# Patient Record
Sex: FEMALE | Race: WHITE | NOT HISPANIC OR LATINO
[De-identification: names, ages, dates, MRNs, and addresses within clinical notes are randomized per-mention and may not be internally consistent; named-entity substitution may affect disease eponyms.]

---

## 2018-09-15 ENCOUNTER — TRANSCRIPTION ENCOUNTER (OUTPATIENT)
Age: 33
End: 2018-09-15

## 2019-12-08 ENCOUNTER — TRANSCRIPTION ENCOUNTER (OUTPATIENT)
Age: 34
End: 2019-12-08

## 2021-01-09 ENCOUNTER — NON-APPOINTMENT (OUTPATIENT)
Age: 36
End: 2021-01-09

## 2021-07-22 ENCOUNTER — TRANSCRIPTION ENCOUNTER (OUTPATIENT)
Age: 36
End: 2021-07-22

## 2022-01-02 ENCOUNTER — TRANSCRIPTION ENCOUNTER (OUTPATIENT)
Age: 37
End: 2022-01-02

## 2022-03-16 ENCOUNTER — NON-APPOINTMENT (OUTPATIENT)
Age: 37
End: 2022-03-16

## 2022-03-17 ENCOUNTER — APPOINTMENT (OUTPATIENT)
Dept: BREAST CENTER | Facility: CLINIC | Age: 37
End: 2022-03-17
Payer: COMMERCIAL

## 2022-03-17 VITALS
SYSTOLIC BLOOD PRESSURE: 116 MMHG | BODY MASS INDEX: 25.49 KG/M2 | DIASTOLIC BLOOD PRESSURE: 85 MMHG | WEIGHT: 135 LBS | HEIGHT: 61 IN | HEART RATE: 91 BPM

## 2022-03-17 DIAGNOSIS — Z80.0 FAMILY HISTORY OF MALIGNANT NEOPLASM OF DIGESTIVE ORGANS: ICD-10-CM

## 2022-03-17 DIAGNOSIS — Z78.9 OTHER SPECIFIED HEALTH STATUS: ICD-10-CM

## 2022-03-17 DIAGNOSIS — N64.4 MASTODYNIA: ICD-10-CM

## 2022-03-17 DIAGNOSIS — Z80.3 FAMILY HISTORY OF MALIGNANT NEOPLASM OF BREAST: ICD-10-CM

## 2022-03-17 DIAGNOSIS — Z80.7 FAMILY HISTORY OF OTHER MALIGNANT NEOPLASMS OF LYMPHOID, HEMATOPOIETIC AND RELATED TISSUES: ICD-10-CM

## 2022-03-17 DIAGNOSIS — N64.59 OTHER SIGNS AND SYMPTOMS IN BREAST: ICD-10-CM

## 2022-03-17 PROCEDURE — 99204 OFFICE O/P NEW MOD 45 MIN: CPT

## 2022-03-17 RX ORDER — BACILLUS COAGULANS/INULIN 1B-250 MG
CAPSULE ORAL
Refills: 0 | Status: ACTIVE | COMMUNITY

## 2022-03-17 NOTE — PAST MEDICAL HISTORY
[Menarche Age ____] : age at menarche was [unfilled] [unknown] : the patient is unsure of the date of her LMP [Total Preg ___] : G[unfilled] [Live Births ___] : P[unfilled]  [Age At Live Birth ___] : Age at live birth: [unfilled] [History of Hormone Replacement Treatment] : has no history of hormone replacement treatment [de-identified] : not currently pregnant [FreeTextEntry3] : currently 4 weeks postpartum [FreeTextEntry7] : pill use for 10 years [FreeTextEntry8] : 13 mo + 9 mo + currently breastfeeding

## 2022-03-17 NOTE — HISTORY OF PRESENT ILLNESS
[FreeTextEntry1] : Ms. Marquez is a 36 year old woman who presents for a consultation for right nipple problems. She had a baby a month ago. Her baby had a tongue tie at birth which led to a raw right nipple after breastfeeding until it was corrected with surgery. She also had right mastitis 4 days after delivery that was treated with antibiotics. In addition, she has had two right nipple fissures. She has nipple pain that radiates to the back of the breast. There is a "bleb" in the nipple that has persisted. She has a tendency towards milk oversupply and tends to breastfeed the baby on one side and pump the other at each feeding. No lumps.\par \par Her family history is significant for breast cancer in a maternal great grandmother (MGF's mother).

## 2022-03-17 NOTE — PHYSICAL EXAM
[Normocephalic] : normocephalic [Atraumatic] : atraumatic [Sclera nonicteric] : sclera nonicteric [Supple] : supple [No Supraclavicular Adenopathy] : no supraclavicular adenopathy [No Cervical Adenopathy] : no cervical adenopathy [Clear to Auscultation Bilat] : clear to auscultation bilaterally [Normal Sinus Rhythm] : normal sinus rhythm [Examined in the supine and seated position] : examined in the supine and seated position [No dominant masses] : no dominant masses in right breast  [No dominant masses] : no dominant masses left breast [No Nipple Retraction] : no left nipple retraction [No Axillary Lymphadenopathy] : no left axillary lymphadenopathy [Soft] : abdomen soft [No Edema] : no edema [No Rashes] : no rashes [No Ulceration] : no ulceration [de-identified] : 0.1 cm superficial skin-colored bump on nipple. No erythema or fluctuance.

## 2022-03-21 ENCOUNTER — APPOINTMENT (OUTPATIENT)
Dept: BREAST CENTER | Facility: CLINIC | Age: 37
End: 2022-03-21
Payer: COMMERCIAL

## 2022-03-21 VITALS
HEIGHT: 61 IN | DIASTOLIC BLOOD PRESSURE: 83 MMHG | SYSTOLIC BLOOD PRESSURE: 134 MMHG | BODY MASS INDEX: 25.49 KG/M2 | WEIGHT: 135 LBS | HEART RATE: 74 BPM

## 2022-03-21 PROCEDURE — 99213 OFFICE O/P EST LOW 20 MIN: CPT

## 2022-03-21 NOTE — PHYSICAL EXAM
[Normocephalic] : normocephalic [Atraumatic] : atraumatic [Sclera nonicteric] : sclera nonicteric [Examined in the supine and seated position] : examined in the supine and seated position [No dominant masses] : no dominant masses in right breast  [No dominant masses] : no dominant masses left breast [No Nipple Retraction] : no right nipple retraction [No Axillary Lymphadenopathy] : no right axillary lymphadenopathy [No Edema] : no edema [No Rashes] : no rashes [No Ulceration] : no ulceration [de-identified] : Tiny dermal nodule in nipple. Trace erythema in UIQ. No induration or fluctuance

## 2022-03-21 NOTE — HISTORY OF PRESENT ILLNESS
[FreeTextEntry1] : Ms. Marquez is a 36 year old woman here for a follow-up for right mastitis. She is one month postpartum. Her baby had a tongue tie at birth which led to a raw right nipple. She has also had right mastitis 4 days after delivery, and two right nipple fissures. She has a tiny dermal bump in the right nipple, and right nipple pain that radiates to the back of the breast. \par \par Two days ago, she noticed pain and warmth in the right upper and medial breast. She also had chills, and a day ago noticed a red streak going from the nipple up. The red streak has subsided. She will be picking up antibiotics after this office visit. \par \par Her family history is significant for breast cancer in a maternal great grandmother (MGF's mother).

## 2022-03-21 NOTE — PAST MEDICAL HISTORY
[Menarche Age ____] : age at menarche was [unfilled] [History of Hormone Replacement Treatment] : has no history of hormone replacement treatment [unknown] : the patient is unsure of the date of her LMP [Total Preg ___] : G[unfilled] [Live Births ___] : P[unfilled]  [Age At Live Birth ___] : Age at live birth: [unfilled] [de-identified] : not currently pregnant [FreeTextEntry3] : currently 4 weeks postpartum [FreeTextEntry7] : pill use for 10 years [FreeTextEntry8] : 13 mo + 9 mo + currently breastfeeding

## 2022-03-28 ENCOUNTER — EMERGENCY (EMERGENCY)
Facility: HOSPITAL | Age: 37
LOS: 0 days | Discharge: HOME | End: 2022-03-29
Attending: EMERGENCY MEDICINE
Payer: COMMERCIAL

## 2022-03-28 VITALS — WEIGHT: 134.92 LBS | HEIGHT: 61 IN

## 2022-03-28 DIAGNOSIS — R06.02 SHORTNESS OF BREATH: ICD-10-CM

## 2022-03-28 DIAGNOSIS — R07.89 OTHER CHEST PAIN: ICD-10-CM

## 2022-03-28 DIAGNOSIS — Z20.822 CONTACT WITH AND (SUSPECTED) EXPOSURE TO COVID-19: ICD-10-CM

## 2022-03-28 LAB
ALBUMIN SERPL ELPH-MCNC: 3.6 G/DL — SIGNIFICANT CHANGE UP (ref 3.3–5)
ALP SERPL-CCNC: 73 U/L — SIGNIFICANT CHANGE UP (ref 40–120)
ALT FLD-CCNC: 28 U/L — SIGNIFICANT CHANGE UP (ref 12–78)
ANION GAP SERPL CALC-SCNC: 4 MMOL/L — LOW (ref 5–17)
APTT BLD: 33.6 SEC — SIGNIFICANT CHANGE UP (ref 27.5–35.5)
AST SERPL-CCNC: 16 U/L — SIGNIFICANT CHANGE UP (ref 15–37)
BASOPHILS # BLD AUTO: 0.02 K/UL — SIGNIFICANT CHANGE UP (ref 0–0.2)
BASOPHILS NFR BLD AUTO: 0.3 % — SIGNIFICANT CHANGE UP (ref 0–2)
BILIRUB SERPL-MCNC: 0.3 MG/DL — SIGNIFICANT CHANGE UP (ref 0.2–1.2)
BUN SERPL-MCNC: 12 MG/DL — SIGNIFICANT CHANGE UP (ref 7–23)
CALCIUM SERPL-MCNC: 9.1 MG/DL — SIGNIFICANT CHANGE UP (ref 8.5–10.1)
CHLORIDE SERPL-SCNC: 109 MMOL/L — HIGH (ref 96–108)
CO2 SERPL-SCNC: 28 MMOL/L — SIGNIFICANT CHANGE UP (ref 22–31)
CREAT SERPL-MCNC: 0.84 MG/DL — SIGNIFICANT CHANGE UP (ref 0.5–1.3)
D DIMER BLD IA.RAPID-MCNC: <150 NG/ML DDU — SIGNIFICANT CHANGE UP
EGFR: 92 ML/MIN/1.73M2 — SIGNIFICANT CHANGE UP
EOSINOPHIL # BLD AUTO: 0 K/UL — SIGNIFICANT CHANGE UP (ref 0–0.5)
EOSINOPHIL NFR BLD AUTO: 0 % — SIGNIFICANT CHANGE UP (ref 0–6)
FLUAV AG NPH QL: SIGNIFICANT CHANGE UP
FLUBV AG NPH QL: SIGNIFICANT CHANGE UP
GLUCOSE SERPL-MCNC: 98 MG/DL — SIGNIFICANT CHANGE UP (ref 70–99)
HCG SERPL-ACNC: <1 MIU/ML — SIGNIFICANT CHANGE UP
HCT VFR BLD CALC: 41.2 % — SIGNIFICANT CHANGE UP (ref 34.5–45)
HGB BLD-MCNC: 14 G/DL — SIGNIFICANT CHANGE UP (ref 11.5–15.5)
IMM GRANULOCYTES NFR BLD AUTO: 0.1 % — SIGNIFICANT CHANGE UP (ref 0–1.5)
INR BLD: 1.03 RATIO — SIGNIFICANT CHANGE UP (ref 0.88–1.16)
LYMPHOCYTES # BLD AUTO: 1.79 K/UL — SIGNIFICANT CHANGE UP (ref 1–3.3)
LYMPHOCYTES # BLD AUTO: 25.7 % — SIGNIFICANT CHANGE UP (ref 13–44)
MAGNESIUM SERPL-MCNC: 2.1 MG/DL — SIGNIFICANT CHANGE UP (ref 1.6–2.6)
MCHC RBC-ENTMCNC: 31.5 PG — SIGNIFICANT CHANGE UP (ref 27–34)
MCHC RBC-ENTMCNC: 34 GM/DL — SIGNIFICANT CHANGE UP (ref 32–36)
MCV RBC AUTO: 92.6 FL — SIGNIFICANT CHANGE UP (ref 80–100)
MONOCYTES # BLD AUTO: 0.71 K/UL — SIGNIFICANT CHANGE UP (ref 0–0.9)
MONOCYTES NFR BLD AUTO: 10.2 % — SIGNIFICANT CHANGE UP (ref 2–14)
NEUTROPHILS # BLD AUTO: 4.44 K/UL — SIGNIFICANT CHANGE UP (ref 1.8–7.4)
NEUTROPHILS NFR BLD AUTO: 63.7 % — SIGNIFICANT CHANGE UP (ref 43–77)
NT-PROBNP SERPL-SCNC: 36 PG/ML — SIGNIFICANT CHANGE UP (ref 0–125)
PLATELET # BLD AUTO: 191 K/UL — SIGNIFICANT CHANGE UP (ref 150–400)
POTASSIUM SERPL-MCNC: 3.9 MMOL/L — SIGNIFICANT CHANGE UP (ref 3.5–5.3)
POTASSIUM SERPL-SCNC: 3.9 MMOL/L — SIGNIFICANT CHANGE UP (ref 3.5–5.3)
PROT SERPL-MCNC: 6.9 GM/DL — SIGNIFICANT CHANGE UP (ref 6–8.3)
PROTHROM AB SERPL-ACNC: 12 SEC — SIGNIFICANT CHANGE UP (ref 10.5–13.4)
RBC # BLD: 4.45 M/UL — SIGNIFICANT CHANGE UP (ref 3.8–5.2)
RBC # FLD: 11.1 % — SIGNIFICANT CHANGE UP (ref 10.3–14.5)
RSV RNA NPH QL NAA+NON-PROBE: SIGNIFICANT CHANGE UP
SARS-COV-2 RNA SPEC QL NAA+PROBE: SIGNIFICANT CHANGE UP
SODIUM SERPL-SCNC: 141 MMOL/L — SIGNIFICANT CHANGE UP (ref 135–145)
TROPONIN I, HIGH SENSITIVITY RESULT: 9.11 NG/L — SIGNIFICANT CHANGE UP
WBC # BLD: 6.97 K/UL — SIGNIFICANT CHANGE UP (ref 3.8–10.5)
WBC # FLD AUTO: 6.97 K/UL — SIGNIFICANT CHANGE UP (ref 3.8–10.5)

## 2022-03-28 PROCEDURE — 84702 CHORIONIC GONADOTROPIN TEST: CPT

## 2022-03-28 PROCEDURE — 93005 ELECTROCARDIOGRAM TRACING: CPT

## 2022-03-28 PROCEDURE — 83735 ASSAY OF MAGNESIUM: CPT

## 2022-03-28 PROCEDURE — 80053 COMPREHEN METABOLIC PANEL: CPT

## 2022-03-28 PROCEDURE — 71275 CT ANGIOGRAPHY CHEST: CPT | Mod: MA

## 2022-03-28 PROCEDURE — 85730 THROMBOPLASTIN TIME PARTIAL: CPT

## 2022-03-28 PROCEDURE — 99285 EMERGENCY DEPT VISIT HI MDM: CPT

## 2022-03-28 PROCEDURE — 85025 COMPLETE CBC W/AUTO DIFF WBC: CPT

## 2022-03-28 PROCEDURE — 85379 FIBRIN DEGRADATION QUANT: CPT

## 2022-03-28 PROCEDURE — 99285 EMERGENCY DEPT VISIT HI MDM: CPT | Mod: 25

## 2022-03-28 PROCEDURE — 85610 PROTHROMBIN TIME: CPT

## 2022-03-28 PROCEDURE — 93010 ELECTROCARDIOGRAM REPORT: CPT

## 2022-03-28 PROCEDURE — 0241U: CPT

## 2022-03-28 PROCEDURE — 71275 CT ANGIOGRAPHY CHEST: CPT | Mod: 26,MA

## 2022-03-28 PROCEDURE — 84484 ASSAY OF TROPONIN QUANT: CPT

## 2022-03-28 PROCEDURE — 36415 COLL VENOUS BLD VENIPUNCTURE: CPT

## 2022-03-28 PROCEDURE — 83880 ASSAY OF NATRIURETIC PEPTIDE: CPT

## 2022-03-28 NOTE — ED PROVIDER NOTE - NSFOLLOWUPINSTRUCTIONS_ED_ALL_ED_FT
please follow up with your doctor in 2-3 days.   take motrin and tylenol for pain.   may use ice or heating pads for comfort.   drink plenty of fluids.   return to ED for any concerns

## 2022-03-28 NOTE — ED PROVIDER NOTE - PATIENT PORTAL LINK FT
You can access the FollowMyHealth Patient Portal offered by [facility] by registering at the following website: [email or web address]. By joining [employer]’s FollowMyHealth portal, you will also be able to view your health information using other applications (apps) compatible with our system.

## 2022-03-28 NOTE — ED PROVIDER NOTE - OBJECTIVE STATEMENT
37 y/o female approx 5 weeks post partum in ED c/o intermittent worsening cp and sob x 3 days.   pt states worse with movement and deep breathing.    states took motrin with some relief.   pt denies any fever, HA, n/v/d/abd pain.   tolerating PO.   states currently on abx for mastitis.

## 2022-03-28 NOTE — ED STATDOCS - PROGRESS NOTE DETAILS
Martha Silver, DO:  Pt. is a 37 yo F  approx 5 weeks post partum s/p  currently on 6 days of doxycycline for mastitis presents with chest pain since last night.  Patients pain is in left side of sternum and left chest and in left lateral ribs and left axilla and arm.  She states since she has mastitis she rotates her body to feed so it is not as painful.  She took advil today with some relief.  She states pain feels like tightness and it was hard to breathe while in the car on the way home from the plastic surgeon she saw today for breast evaluation.  She was told her breast exam was normal and there was nothing to drain or treat.  Patient is nursing and has nursed with all pregnancies.  She states her lactation nurse told her she has a bleb on her right nipple which is causing the pain.  Patient had EKG in intake showing flipped T waves in III, AvF and v3.  She was sent to the main ED for evaluation.  Discussed possible diagnoses with patient including costochondritis. pleurisy, and PE due to post partum status.

## 2022-03-28 NOTE — ED ADULT NURSE NOTE - CHIEF COMPLAINT QUOTE
Pt sent in from [facility] c/o L sided intermittent chest pain radiating to the L shoulder since last night. Pain is worse on inspiration. Currently 5 weeks postpartum, has R breast mastitis-on her 6th day of doxycyline. Denies SOB and fevers at this time. EKG in progress.

## 2022-03-28 NOTE — ED ADULT NURSE NOTE - NSHOSCREENINGQ1_ED_ALL_ED
Was the patient seen in the last year in this department? {Yes/No:20266}    Does patient have an active prescription for medications requested? {Yes/No:20266}    Received Request Via: {REFILL PATIENT/PHARMACY:[id number]}      
No

## 2022-03-28 NOTE — ED ADULT TRIAGE NOTE - CHIEF COMPLAINT QUOTE
Pt sent in from [facility] c/o L sided intermittent chest pain radiating to the L shoulder since last night. Pain is worse on inspiration. Currently 5 months postpartum, has R breast mastitis-on her 6th day of doxycyline. Denies SOB and fevers at this time. EKG in progress. Pt sent in from [facility] c/o L sided intermittent chest pain radiating to the L shoulder since last night. Pain is worse on inspiration. Currently 5 weeks postpartum, has R breast mastitis-on her 6th day of doxycyline. Denies SOB and fevers at this time. EKG in progress.

## 2022-03-28 NOTE — ED ADULT NURSE NOTE - OBJECTIVE STATEMENT
Pt presents to the ED c/o right nipple pain and left chest pain and SOB since Saturday. Pt had a vaginal birth 5 weeks ago and has had mastitis twice in the left breast. Chest pain on the left side gets worse upon inspiration and radiates to the left back. pt endorses SOB but is able to speak in full sentences. Respirations even and unlabored. Denies fevers, chills, N/V/D. pt is normal sinus on the cardiac monitor.

## 2022-03-29 VITALS
DIASTOLIC BLOOD PRESSURE: 82 MMHG | TEMPERATURE: 98 F | RESPIRATION RATE: 17 BRPM | SYSTOLIC BLOOD PRESSURE: 115 MMHG | HEART RATE: 80 BPM | OXYGEN SATURATION: 99 %

## 2022-03-31 RX ORDER — DICLOXACILLIN SODIUM 500 MG/1
500 CAPSULE ORAL 4 TIMES DAILY
Qty: 4 | Refills: 0 | Status: ACTIVE | COMMUNITY
Start: 2022-03-31 | End: 1900-01-01

## 2022-04-01 ENCOUNTER — APPOINTMENT (OUTPATIENT)
Dept: BREAST CENTER | Facility: CLINIC | Age: 37
End: 2022-04-01
Payer: COMMERCIAL

## 2022-04-01 VITALS
SYSTOLIC BLOOD PRESSURE: 126 MMHG | DIASTOLIC BLOOD PRESSURE: 84 MMHG | HEART RATE: 70 BPM | BODY MASS INDEX: 25.49 KG/M2 | HEIGHT: 61 IN | WEIGHT: 135 LBS

## 2022-04-01 DIAGNOSIS — B37.9 CANDIDIASIS, UNSPECIFIED: ICD-10-CM

## 2022-04-01 DIAGNOSIS — N61.0 MASTITIS WITHOUT ABSCESS: ICD-10-CM

## 2022-04-01 PROCEDURE — 99213 OFFICE O/P EST LOW 20 MIN: CPT

## 2022-04-01 RX ORDER — AMOXICILLIN AND CLAVULANATE POTASSIUM 875; 125 MG/1; MG/1
875-125 TABLET, COATED ORAL TWICE DAILY
Qty: 10 | Refills: 0 | Status: ACTIVE | COMMUNITY
Start: 2022-04-01 | End: 1900-01-01

## 2022-04-01 RX ORDER — FLUCONAZOLE 150 MG/1
150 TABLET ORAL
Qty: 1 | Refills: 0 | Status: ACTIVE | COMMUNITY
Start: 2022-04-01 | End: 1900-01-01

## 2022-04-01 NOTE — PHYSICAL EXAM
[Normocephalic] : normocephalic [Atraumatic] : atraumatic [Sclera nonicteric] : sclera nonicteric [Examined in the supine and seated position] : examined in the supine and seated position [No dominant masses] : no dominant masses in right breast  [No dominant masses] : no dominant masses left breast [No Nipple Retraction] : no left nipple retraction [No Edema] : no edema [No Rashes] : no rashes [No Ulceration] : no ulceration [de-identified] : Trace erythema in LOQ. No warmth or fluctuance. Breast is soft.

## 2022-04-01 NOTE — PAST MEDICAL HISTORY
[Menarche Age ____] : age at menarche was [unfilled] [History of Hormone Replacement Treatment] : has no history of hormone replacement treatment [unknown] : the patient is unsure of the date of her LMP [Total Preg ___] : G[unfilled] [Live Births ___] : P[unfilled]  [Age At Live Birth ___] : Age at live birth: [unfilled] [FreeTextEntry3] : currently 4 weeks postpartum [de-identified] : not currently pregnant [FreeTextEntry7] : pill use for 10 years [FreeTextEntry8] : 13 mo + 9 mo + currently breastfeeding

## 2022-04-01 NOTE — PROCEDURE
[FreeTextEntry1] : R targeted US [FreeTextEntry2] : R mastitis [FreeTextEntry3] : The patient was placed in a supine position, and the right lower outer breast was scanned in both transverse and sagittal orientations. No fluid collection is seen.

## 2022-04-01 NOTE — HISTORY OF PRESENT ILLNESS
[FreeTextEntry1] : Ms. Marquez is a 36 year old woman here for a follow-up for right mastitis. She is about 1 1/2 months postpartum. She had right mastitis 4 days after delivery, a raw right nipple from when her baby had a tongue tie at birth, and two right nipple fissures. She has a tiny dermal bump in the right nipple, and right nipple pain that radiates to the back of the breast. 2 weeks ago, she noticed redness in the right upper inner breast and one days of chills; her symptoms improved on dicloxacillin. Earlier this week, she had pain in the left breast and shortness of breast; she went to the ED and was rule out for an MI and PE, and after some chiropractor manipulation of a left rib, she felt better. 2 days ago, she noticed redness in the right lower outer breast and chills for one day. The redness is better now. She has decided that once she is better from mastitis she would like to wean from breastfeeding.\par \par Her family history is significant for breast cancer in a maternal great grandmother (MGF's mother).

## 2022-11-18 NOTE — ED ADULT NURSE NOTE - CHPI ED NUR DURATION
Counseling and Referral of Other Preventative  (Italic type indicates deductible and co-insurance are waived)    Patient Name: Valarie Colindres  Today's Date: 11/18/2022    Health Maintenance       Date Due Completion Date    COVID-19 Vaccine (5 - Booster for Pfizer series) 08/10/2022 6/15/2022    Shingles Vaccine (2 of 2) 10/06/2022 8/11/2022    Colonoscopy 12/29/2022 12/29/2017    DEXA Scan 12/10/2024 12/10/2021    Lipid Panel 06/11/2027 6/11/2022    TETANUS VACCINE 09/02/2032 9/2/2022    Override on 1/1/2004: Done        No orders of the defined types were placed in this encounter.    The following information is provided to all patients.  This information is to help you find resources for any of the problems found today that may be affecting your health:                Living healthy guide: www.[facility].gov      Understanding Diabetes: www.diabetes.org      Eating healthy: www.cdc.gov/healthyweight      CDC home safety checklist: www.cdc.gov/steadi/patient.html      Agency on Aging: www.goea.[place].[facility]      Alcoholics anonymous (AA): www.aa.org      Physical Activity: www.neela.nih.gov/wc9lmbn      Tobacco use: www.quitwithusla.org      day(s)

## 2023-08-29 ENCOUNTER — OUTPATIENT (OUTPATIENT)
Dept: OUTPATIENT SERVICES | Facility: HOSPITAL | Age: 38
LOS: 1 days | End: 2023-08-29
Payer: COMMERCIAL

## 2023-08-29 VITALS
SYSTOLIC BLOOD PRESSURE: 113 MMHG | HEIGHT: 61 IN | RESPIRATION RATE: 16 BRPM | OXYGEN SATURATION: 98 % | HEART RATE: 78 BPM | DIASTOLIC BLOOD PRESSURE: 78 MMHG | WEIGHT: 134.92 LBS

## 2023-08-29 DIAGNOSIS — Z87.39 PERSONAL HISTORY OF OTHER DISEASES OF THE MUSCULOSKELETAL SYSTEM AND CONNECTIVE TISSUE: Chronic | ICD-10-CM

## 2023-08-29 DIAGNOSIS — R29.898 OTHER SYMPTOMS AND SIGNS INVOLVING THE MUSCULOSKELETAL SYSTEM: Chronic | ICD-10-CM

## 2023-08-29 DIAGNOSIS — Z01.818 ENCOUNTER FOR OTHER PREPROCEDURAL EXAMINATION: ICD-10-CM

## 2023-08-29 DIAGNOSIS — Z86.59 PERSONAL HISTORY OF OTHER MENTAL AND BEHAVIORAL DISORDERS: Chronic | ICD-10-CM

## 2023-08-29 DIAGNOSIS — Z98.82 BREAST IMPLANT STATUS: Chronic | ICD-10-CM

## 2023-08-29 DIAGNOSIS — Z33.2 ENCOUNTER FOR ELECTIVE TERMINATION OF PREGNANCY: ICD-10-CM

## 2023-08-29 DIAGNOSIS — O02.1 MISSED ABORTION: ICD-10-CM

## 2023-08-29 LAB
ANION GAP SERPL CALC-SCNC: 6 MMOL/L — SIGNIFICANT CHANGE UP (ref 5–17)
BUN SERPL-MCNC: 12 MG/DL — SIGNIFICANT CHANGE UP (ref 7–23)
CALCIUM SERPL-MCNC: 8.7 MG/DL — SIGNIFICANT CHANGE UP (ref 8.5–10.1)
CHLORIDE SERPL-SCNC: 107 MMOL/L — SIGNIFICANT CHANGE UP (ref 96–108)
CO2 SERPL-SCNC: 26 MMOL/L — SIGNIFICANT CHANGE UP (ref 22–31)
CREAT SERPL-MCNC: 0.79 MG/DL — SIGNIFICANT CHANGE UP (ref 0.5–1.3)
EGFR: 98 ML/MIN/1.73M2 — SIGNIFICANT CHANGE UP
GLUCOSE SERPL-MCNC: 80 MG/DL — SIGNIFICANT CHANGE UP (ref 70–99)
HCG SERPL-ACNC: HIGH MIU/ML
HCT VFR BLD CALC: 41.2 % — SIGNIFICANT CHANGE UP (ref 34.5–45)
HGB BLD-MCNC: 14.2 G/DL — SIGNIFICANT CHANGE UP (ref 11.5–15.5)
MCHC RBC-ENTMCNC: 30.9 PG — SIGNIFICANT CHANGE UP (ref 27–34)
MCHC RBC-ENTMCNC: 34.5 GM/DL — SIGNIFICANT CHANGE UP (ref 32–36)
MCV RBC AUTO: 89.8 FL — SIGNIFICANT CHANGE UP (ref 80–100)
NRBC # BLD: 0 /100 WBCS — SIGNIFICANT CHANGE UP (ref 0–0)
PLATELET # BLD AUTO: 163 K/UL — SIGNIFICANT CHANGE UP (ref 150–400)
POTASSIUM SERPL-MCNC: 3.9 MMOL/L — SIGNIFICANT CHANGE UP (ref 3.5–5.3)
POTASSIUM SERPL-SCNC: 3.9 MMOL/L — SIGNIFICANT CHANGE UP (ref 3.5–5.3)
RBC # BLD: 4.59 M/UL — SIGNIFICANT CHANGE UP (ref 3.8–5.2)
RBC # FLD: 11.4 % — SIGNIFICANT CHANGE UP (ref 10.3–14.5)
SODIUM SERPL-SCNC: 139 MMOL/L — SIGNIFICANT CHANGE UP (ref 135–145)
WBC # BLD: 4.78 K/UL — SIGNIFICANT CHANGE UP (ref 3.8–10.5)
WBC # FLD AUTO: 4.78 K/UL — SIGNIFICANT CHANGE UP (ref 3.8–10.5)

## 2023-08-29 PROCEDURE — 36415 COLL VENOUS BLD VENIPUNCTURE: CPT

## 2023-08-29 PROCEDURE — 85027 COMPLETE CBC AUTOMATED: CPT

## 2023-08-29 PROCEDURE — 80048 BASIC METABOLIC PNL TOTAL CA: CPT

## 2023-08-29 PROCEDURE — 86850 RBC ANTIBODY SCREEN: CPT

## 2023-08-29 PROCEDURE — 84702 CHORIONIC GONADOTROPIN TEST: CPT

## 2023-08-29 PROCEDURE — 86900 BLOOD TYPING SEROLOGIC ABO: CPT

## 2023-08-29 PROCEDURE — 86901 BLOOD TYPING SEROLOGIC RH(D): CPT

## 2023-08-29 PROCEDURE — G0463: CPT

## 2023-08-29 NOTE — H&P PST ADULT - NSICDXPASTSURGICALHX_GEN_ALL_CORE_FT
PAST SURGICAL HISTORY:  H/O anxiety disorder     History of TMJ disorder     S/P breast augmentation     TMJ click

## 2023-08-29 NOTE — H&P PST ADULT - MUSCULOSKELETAL
weight-bearing as tolerated/in knee immobilizer Statement Selected ROM intact/no joint swelling/no joint erythema/no joint warmth/no calf tenderness/normal gait/strength 5/5 bilateral upper extremities/strength 5/5 bilateral lower extremities negative

## 2023-08-29 NOTE — H&P PST ADULT - NSICDXPROCEDURE_GEN_ALL_CORE_FT
PROCEDURES:  D&C (dilatation and curettage, scraping of uterus) 29-Aug-2023 08:12:31  Dashawn Del Cid

## 2023-08-29 NOTE — H&P PST ADULT - ADDITIONAL PE
affect , insight & judgement intact, characteristics of appearance, behaviour & verbalization are appropriate, responsive to Meds /follow up.

## 2023-08-29 NOTE — H&P PST ADULT - PROBLEM SELECTOR PLAN 1
suction & curettage  on 08/31/2023.  Preop cbc, bmp, t/s & HCG qt, sent   Instructed to inform surgeon & seek medical attention if any changes in health  status like fever, chills, rash, infections, chest pain or any changes in health status like loss of taste or smell, throat pain or diarhea . PST  instructions given in written/ explained about NPO,   ARRIVAL TIME  2 hours prior to OR time.Pre-op education provided - all questions answered. Pt verbalized understanding.

## 2023-08-29 NOTE — H&P PST ADULT - ASSESSMENT
Missed  :  suction & curettage  on 2023.    Activity:   physically pretty active, Exercise in [employer] 2x/week, home chores, walks a lot  Energy Expenditure score (DASI SCORE METS): 8.2  Symptoms : denies chest pain, palpitations, dyspnea, dizziness or  OVERTON,   Dental: Patient denies Loose teeth/Denture.

## 2023-08-29 NOTE — H&P PST ADULT - HISTORY OF PRESENT ILLNESS
38 year old female  LMP 23 @ 6w4d of pregnancy reports having no heart beat, presents for scheduled  suction & curettage  on 2023.  PMH/PSH:  anxiety, s/p Bilateral breast augmentation surgery in 2023, TMJ repair with no complaints after Surgery( , )  & low back pain.

## 2023-08-29 NOTE — H&P PST ADULT - SKIN/BREAST COMMENTS
s/p bilateral breat augmentation  with silicone breast implants on 06/29/2023 went on well with out any problems,  doing good

## 2023-08-30 ENCOUNTER — TRANSCRIPTION ENCOUNTER (OUTPATIENT)
Age: 38
End: 2023-08-30

## 2023-08-30 NOTE — ASU PATIENT PROFILE, ADULT - FALL HARM RISK - UNIVERSAL INTERVENTIONS
Bed in lowest position, wheels locked, appropriate side rails in place/Call bell, personal items and telephone in reach/Instruct patient to call for assistance before getting out of bed or chair/Non-slip footwear when patient is out of bed/[place] to call system/Physically safe environment - no spills, clutter or unnecessary equipment/Purposeful Proactive Rounding/Room/bathroom lighting operational, light cord in reach

## 2023-08-31 ENCOUNTER — TRANSCRIPTION ENCOUNTER (OUTPATIENT)
Age: 38
End: 2023-08-31

## 2023-08-31 ENCOUNTER — OUTPATIENT (OUTPATIENT)
Dept: OUTPATIENT SERVICES | Facility: HOSPITAL | Age: 38
LOS: 1 days | End: 2023-08-31
Payer: COMMERCIAL

## 2023-08-31 VITALS
OXYGEN SATURATION: 98 % | HEIGHT: 61 IN | DIASTOLIC BLOOD PRESSURE: 77 MMHG | TEMPERATURE: 98 F | WEIGHT: 134.92 LBS | HEART RATE: 87 BPM | RESPIRATION RATE: 18 BRPM | SYSTOLIC BLOOD PRESSURE: 126 MMHG

## 2023-08-31 VITALS
HEART RATE: 74 BPM | RESPIRATION RATE: 17 BRPM | OXYGEN SATURATION: 100 % | DIASTOLIC BLOOD PRESSURE: 80 MMHG | SYSTOLIC BLOOD PRESSURE: 119 MMHG

## 2023-08-31 DIAGNOSIS — R29.898 OTHER SYMPTOMS AND SIGNS INVOLVING THE MUSCULOSKELETAL SYSTEM: Chronic | ICD-10-CM

## 2023-08-31 DIAGNOSIS — Z33.2 ENCOUNTER FOR ELECTIVE TERMINATION OF PREGNANCY: ICD-10-CM

## 2023-08-31 DIAGNOSIS — Z86.59 PERSONAL HISTORY OF OTHER MENTAL AND BEHAVIORAL DISORDERS: Chronic | ICD-10-CM

## 2023-08-31 DIAGNOSIS — Z87.39 PERSONAL HISTORY OF OTHER DISEASES OF THE MUSCULOSKELETAL SYSTEM AND CONNECTIVE TISSUE: Chronic | ICD-10-CM

## 2023-08-31 DIAGNOSIS — Z98.82 BREAST IMPLANT STATUS: Chronic | ICD-10-CM

## 2023-08-31 LAB — ABO RH CONFIRMATION: SIGNIFICANT CHANGE UP

## 2023-08-31 PROCEDURE — 59840 INDUCED ABORTION D&C: CPT

## 2023-08-31 PROCEDURE — 88305 TISSUE EXAM BY PATHOLOGIST: CPT

## 2023-08-31 PROCEDURE — 36415 COLL VENOUS BLD VENIPUNCTURE: CPT

## 2023-08-31 PROCEDURE — 88305 TISSUE EXAM BY PATHOLOGIST: CPT | Mod: 26

## 2023-08-31 RX ORDER — SODIUM CHLORIDE 9 MG/ML
1000 INJECTION, SOLUTION INTRAVENOUS
Refills: 0 | Status: DISCONTINUED | OUTPATIENT
Start: 2023-08-31 | End: 2023-08-31

## 2023-08-31 RX ORDER — ONDANSETRON 8 MG/1
4 TABLET, FILM COATED ORAL ONCE
Refills: 0 | Status: DISCONTINUED | OUTPATIENT
Start: 2023-08-31 | End: 2023-08-31

## 2023-08-31 RX ORDER — FLUVOXAMINE MALEATE 25 MG/1
2 TABLET ORAL
Refills: 0 | DISCHARGE

## 2023-08-31 RX ORDER — CLONAZEPAM 1 MG
1 TABLET ORAL
Refills: 0 | DISCHARGE

## 2023-08-31 RX ORDER — HYDROMORPHONE HYDROCHLORIDE 2 MG/ML
0.5 INJECTION INTRAMUSCULAR; INTRAVENOUS; SUBCUTANEOUS
Refills: 0 | Status: DISCONTINUED | OUTPATIENT
Start: 2023-08-31 | End: 2023-08-31

## 2023-08-31 RX ADMIN — SODIUM CHLORIDE 75 MILLILITER(S): 9 INJECTION, SOLUTION INTRAVENOUS at 10:36

## 2023-08-31 RX ADMIN — Medication 0.2 MILLIGRAM(S): at 09:17

## 2023-08-31 RX ADMIN — SODIUM CHLORIDE 75 MILLILITER(S): 9 INJECTION, SOLUTION INTRAVENOUS at 08:20

## 2023-08-31 NOTE — BRIEF OPERATIVE NOTE - NSICDXBRIEFPROCEDURE_GEN_ALL_CORE_FT
PROCEDURES:  Dilation and curettage for termination of pregnancy 31-Aug-2023 09:53:43  Erasto Harris A

## 2023-08-31 NOTE — ASU DISCHARGE PLAN (ADULT/PEDIATRIC) - CARE PROVIDER_API CALL
Erasto Harris.  Obstetrics and Gynecology  [address] [place] [address]  Phone: [phone]  Fax: [phone]  Follow Up Time:

## 2023-09-01 LAB — SURGICAL PATHOLOGY STUDY: SIGNIFICANT CHANGE UP

## 2023-12-17 ENCOUNTER — NON-APPOINTMENT (OUTPATIENT)
Age: 38
End: 2023-12-17

## 2024-01-04 PROBLEM — Z87.39 PERSONAL HISTORY OF OTHER DISEASES OF THE MUSCULOSKELETAL SYSTEM AND CONNECTIVE TISSUE: Chronic | Status: ACTIVE | Noted: 2023-08-29

## 2024-01-04 PROBLEM — O02.1 MISSED ABORTION: Chronic | Status: ACTIVE | Noted: 2023-08-29

## 2024-01-04 PROBLEM — F41.9 ANXIETY DISORDER, UNSPECIFIED: Chronic | Status: ACTIVE | Noted: 2023-08-29

## 2024-01-08 ENCOUNTER — APPOINTMENT (OUTPATIENT)
Dept: ORTHOPEDIC SURGERY | Facility: CLINIC | Age: 39
End: 2024-01-08
Payer: COMMERCIAL

## 2024-01-08 VITALS — WEIGHT: 135 LBS | BODY MASS INDEX: 25.49 KG/M2 | HEIGHT: 61 IN

## 2024-01-08 DIAGNOSIS — Z78.9 OTHER SPECIFIED HEALTH STATUS: ICD-10-CM

## 2024-01-08 DIAGNOSIS — S39.012A STRAIN OF MUSCLE, FASCIA AND TENDON OF LOWER BACK, INITIAL ENCOUNTER: ICD-10-CM

## 2024-01-08 DIAGNOSIS — M51.26 OTHER INTERVERTEBRAL DISC DISPLACEMENT, LUMBAR REGION: ICD-10-CM

## 2024-01-08 PROCEDURE — 99203 OFFICE O/P NEW LOW 30 MIN: CPT

## 2024-09-29 ENCOUNTER — NON-APPOINTMENT (OUTPATIENT)
Age: 39
End: 2024-09-29

## 2024-10-05 ENCOUNTER — NON-APPOINTMENT (OUTPATIENT)
Age: 39
End: 2024-10-05

## 2024-11-03 ENCOUNTER — NON-APPOINTMENT (OUTPATIENT)
Age: 39
End: 2024-11-03

## 2024-12-04 ENCOUNTER — APPOINTMENT (OUTPATIENT)
Facility: CLINIC | Age: 39
End: 2024-12-04

## 2024-12-04 DIAGNOSIS — S73.192A OTHER SPRAIN OF LEFT HIP, INITIAL ENCOUNTER: ICD-10-CM

## 2024-12-04 PROCEDURE — 99214 OFFICE O/P EST MOD 30 MIN: CPT

## 2024-12-08 PROBLEM — S73.192A TEAR OF LEFT ACETABULAR LABRUM, INITIAL ENCOUNTER: Status: ACTIVE | Noted: 2024-12-08

## 2024-12-31 ENCOUNTER — APPOINTMENT (OUTPATIENT)
Facility: CLINIC | Age: 39
End: 2024-12-31
Payer: COMMERCIAL

## 2024-12-31 DIAGNOSIS — S73.192A OTHER SPRAIN OF LEFT HIP, INITIAL ENCOUNTER: ICD-10-CM

## 2024-12-31 PROCEDURE — 99213 OFFICE O/P EST LOW 20 MIN: CPT | Mod: 25

## 2024-12-31 PROCEDURE — 99203 OFFICE O/P NEW LOW 30 MIN: CPT | Mod: 25

## 2024-12-31 PROCEDURE — 73503 X-RAY EXAM HIP UNI 4/> VIEWS: CPT | Mod: LT

## 2025-01-14 ENCOUNTER — NON-APPOINTMENT (OUTPATIENT)
Age: 40
End: 2025-01-14

## 2025-01-14 ENCOUNTER — APPOINTMENT (OUTPATIENT)
Dept: ORTHOPEDIC SURGERY | Facility: CLINIC | Age: 40
End: 2025-01-14
Payer: COMMERCIAL

## 2025-01-14 VITALS — BODY MASS INDEX: 28.32 KG/M2 | WEIGHT: 150 LBS | HEIGHT: 61 IN

## 2025-01-14 PROCEDURE — 99205 OFFICE O/P NEW HI 60 MIN: CPT

## 2025-01-21 ENCOUNTER — APPOINTMENT (OUTPATIENT)
Dept: ORTHOPEDIC SURGERY | Facility: CLINIC | Age: 40
End: 2025-01-21
Payer: COMMERCIAL

## 2025-01-21 DIAGNOSIS — M25.852 OTHER SPECIFIED JOINT DISORDERS, LEFT HIP: ICD-10-CM

## 2025-01-21 DIAGNOSIS — S73.192A OTHER SPRAIN OF LEFT HIP, INITIAL ENCOUNTER: ICD-10-CM

## 2025-01-21 PROCEDURE — 99214 OFFICE O/P EST MOD 30 MIN: CPT

## 2025-02-12 ENCOUNTER — NON-APPOINTMENT (OUTPATIENT)
Age: 40
End: 2025-02-12

## 2025-02-25 ENCOUNTER — TRANSCRIPTION ENCOUNTER (OUTPATIENT)
Age: 40
End: 2025-02-25

## 2025-02-25 NOTE — ASU DISCHARGE PLAN (ADULT/PEDIATRIC) - CARE PROVIDER_API CALL
Alejandro Ward  Orthopaedic Sports Medicine  [address], [address]  [place] [address]  Phone: [phone]  Fax: [phone]  Follow Up Time:

## 2025-02-25 NOTE — ASU DISCHARGE PLAN (ADULT/PEDIATRIC) - FINANCIAL ASSISTANCE
Health system provides services at a reduced cost to those who are determined to be eligible through Health system’s financial assistance program. Information regarding Health system’s financial assistance program can be found by going to [email or web address] or by calling [phone].

## 2025-02-26 ENCOUNTER — OUTPATIENT (OUTPATIENT)
Dept: INPATIENT UNIT | Facility: HOSPITAL | Age: 40
LOS: 1 days | Discharge: HOME | End: 2025-02-26
Payer: COMMERCIAL

## 2025-02-26 ENCOUNTER — APPOINTMENT (OUTPATIENT)
Dept: ORTHOPEDIC SURGERY | Facility: HOSPITAL | Age: 40
End: 2025-02-26

## 2025-02-26 VITALS
DIASTOLIC BLOOD PRESSURE: 80 MMHG | SYSTOLIC BLOOD PRESSURE: 115 MMHG | RESPIRATION RATE: 16 BRPM | OXYGEN SATURATION: 98 % | TEMPERATURE: 98 F | HEIGHT: 61 IN | HEART RATE: 74 BPM | WEIGHT: 149.91 LBS

## 2025-02-26 VITALS
DIASTOLIC BLOOD PRESSURE: 84 MMHG | HEART RATE: 100 BPM | OXYGEN SATURATION: 100 % | RESPIRATION RATE: 18 BRPM | SYSTOLIC BLOOD PRESSURE: 137 MMHG

## 2025-02-26 DIAGNOSIS — Z86.59 PERSONAL HISTORY OF OTHER MENTAL AND BEHAVIORAL DISORDERS: Chronic | ICD-10-CM

## 2025-02-26 DIAGNOSIS — S73.192A OTHER SPRAIN OF LEFT HIP, INITIAL ENCOUNTER: ICD-10-CM

## 2025-02-26 DIAGNOSIS — Z87.39 PERSONAL HISTORY OF OTHER DISEASES OF THE MUSCULOSKELETAL SYSTEM AND CONNECTIVE TISSUE: Chronic | ICD-10-CM

## 2025-02-26 DIAGNOSIS — Z98.82 BREAST IMPLANT STATUS: Chronic | ICD-10-CM

## 2025-02-26 DIAGNOSIS — R29.898 OTHER SYMPTOMS AND SIGNS INVOLVING THE MUSCULOSKELETAL SYSTEM: Chronic | ICD-10-CM

## 2025-02-26 DIAGNOSIS — M25.852 OTHER SPECIFIED JOINT DISORDERS, LEFT HIP: ICD-10-CM

## 2025-02-26 LAB — HCG UR QL: NEGATIVE — SIGNIFICANT CHANGE UP

## 2025-02-26 PROCEDURE — 81025 URINE PREGNANCY TEST: CPT

## 2025-02-26 PROCEDURE — C9399: CPT

## 2025-02-26 PROCEDURE — C1713: CPT

## 2025-02-26 PROCEDURE — 29916 HIP ARTHRO W/LABRAL REPAIR: CPT | Mod: LT

## 2025-02-26 PROCEDURE — 29914 HIP ARTHRO W/FEMOROPLASTY: CPT | Mod: LT

## 2025-02-26 PROCEDURE — 29999 UNLISTED PX ARTHROSCOPY: CPT | Mod: LT

## 2025-02-26 PROCEDURE — 76000 FLUOROSCOPY <1 HR PHYS/QHP: CPT

## 2025-02-26 RX ORDER — POLYETHYLENE GLYCOL 3350 17 G/17G
17 POWDER, FOR SOLUTION ORAL
Qty: 1 | Refills: 0
Start: 2025-02-26 | End: 2025-03-02

## 2025-02-26 RX ORDER — INDOMETHACIN 50 MG
1 CAPSULE ORAL
Qty: 42 | Refills: 0
Start: 2025-02-26 | End: 2025-03-11

## 2025-02-26 RX ORDER — ASPIRIN 325 MG
1 TABLET ORAL
Qty: 30 | Refills: 0
Start: 2025-02-26 | End: 2025-03-27

## 2025-02-26 RX ORDER — ONDANSETRON HCL/PF 4 MG/2 ML
4 VIAL (ML) INJECTION ONCE
Refills: 0 | Status: DISCONTINUED | OUTPATIENT
Start: 2025-02-26 | End: 2025-02-26

## 2025-02-26 RX ORDER — OXYCODONE HYDROCHLORIDE 30 MG/1
1 TABLET ORAL
Qty: 20 | Refills: 0
Start: 2025-02-26 | End: 2025-03-02

## 2025-02-26 RX ORDER — ACETAMINOPHEN 500 MG/5ML
2 LIQUID (ML) ORAL
Qty: 10 | Refills: 0
Start: 2025-02-26 | End: 2025-03-02

## 2025-02-26 RX ORDER — HYDROMORPHONE/SOD CHLOR,ISO/PF 2 MG/10 ML
0.5 SYRINGE (ML) INJECTION
Refills: 0 | Status: DISCONTINUED | OUTPATIENT
Start: 2025-02-26 | End: 2025-02-26

## 2025-02-26 RX ORDER — SODIUM CHLORIDE 9 G/1000ML
1000 INJECTION, SOLUTION INTRAVENOUS
Refills: 0 | Status: DISCONTINUED | OUTPATIENT
Start: 2025-02-26 | End: 2025-02-26

## 2025-02-26 RX ORDER — APREPITANT 40 MG/1
40 CAPSULE ORAL ONCE
Refills: 0 | Status: COMPLETED | OUTPATIENT
Start: 2025-02-26 | End: 2025-02-26

## 2025-02-26 RX ORDER — ONDANSETRON HCL/PF 4 MG/2 ML
1 VIAL (ML) INJECTION
Qty: 20 | Refills: 0
Start: 2025-02-26 | End: 2025-03-02

## 2025-02-26 RX ORDER — SENNA 187 MG
1 TABLET ORAL
Qty: 14 | Refills: 0
Start: 2025-02-26 | End: 2025-03-04

## 2025-02-26 RX ORDER — CYCLOBENZAPRINE HYDROCHLORIDE 15 MG/1
1 CAPSULE, EXTENDED RELEASE ORAL
Qty: 28 | Refills: 0
Start: 2025-02-26 | End: 2025-03-04

## 2025-02-26 RX ORDER — APREPITANT 40 MG/1
40 CAPSULE ORAL ONCE
Refills: 0 | Status: DISCONTINUED | OUTPATIENT
Start: 2025-02-26 | End: 2025-02-26

## 2025-02-26 RX ADMIN — Medication 0.5 MILLIGRAM(S): at 11:55

## 2025-02-26 RX ADMIN — APREPITANT 40 MILLIGRAM(S): 40 CAPSULE ORAL at 08:50

## 2025-02-26 RX ADMIN — Medication 0.5 MILLIGRAM(S): at 12:25

## 2025-02-26 NOTE — BRIEF OPERATIVE NOTE - NSICDXBRIEFPROCEDURE_GEN_ALL_CORE_FT
PROCEDURES:  Labral repair of hip 26-Feb-2025 11:17:02 left hip, femoroplasty, acetabuloplasty, capsular closure Osiel Luis
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
## 2025-03-05 DIAGNOSIS — M51.26 OTHER INTERVERTEBRAL DISC DISPLACEMENT, LUMBAR REGION: ICD-10-CM
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
## 2025-03-05 DIAGNOSIS — M25.852 OTHER SPECIFIED JOINT DISORDERS, LEFT HIP: ICD-10-CM

## 2025-03-11 ENCOUNTER — APPOINTMENT (OUTPATIENT)
Dept: ORTHOPEDIC SURGERY | Facility: CLINIC | Age: 40
End: 2025-03-11
Payer: COMMERCIAL

## 2025-03-11 DIAGNOSIS — M25.852 OTHER SPECIFIED JOINT DISORDERS, LEFT HIP: ICD-10-CM

## 2025-03-11 DIAGNOSIS — S73.192D OTHER SPRAIN OF LEFT HIP, SUBSEQUENT ENCOUNTER: ICD-10-CM

## 2025-03-11 PROCEDURE — 99024 POSTOP FOLLOW-UP VISIT: CPT

## 2025-03-11 PROCEDURE — 73502 X-RAY EXAM HIP UNI 2-3 VIEWS: CPT

## 2025-03-11 RX ORDER — TRIAMCINOLONE ACETONIDE 1 MG/G
0.1 CREAM TOPICAL TWICE DAILY
Qty: 1 | Refills: 0 | Status: ACTIVE | COMMUNITY
Start: 2025-03-11 | End: 1900-01-01

## 2025-03-14 ENCOUNTER — NON-APPOINTMENT (OUTPATIENT)
Age: 40
End: 2025-03-14

## 2025-03-24 ENCOUNTER — TRANSCRIPTION ENCOUNTER (OUTPATIENT)
Age: 40
End: 2025-03-24

## 2025-04-02 ENCOUNTER — NON-APPOINTMENT (OUTPATIENT)
Age: 40
End: 2025-04-02

## 2025-04-08 ENCOUNTER — APPOINTMENT (OUTPATIENT)
Dept: ORTHOPEDIC SURGERY | Facility: CLINIC | Age: 40
End: 2025-04-08
Payer: COMMERCIAL

## 2025-04-08 DIAGNOSIS — M25.852 OTHER SPECIFIED JOINT DISORDERS, LEFT HIP: ICD-10-CM

## 2025-04-08 DIAGNOSIS — S73.192D OTHER SPRAIN OF LEFT HIP, SUBSEQUENT ENCOUNTER: ICD-10-CM

## 2025-04-08 PROCEDURE — 99024 POSTOP FOLLOW-UP VISIT: CPT

## 2025-05-19 ENCOUNTER — APPOINTMENT (OUTPATIENT)
Dept: ORTHOPEDIC SURGERY | Facility: CLINIC | Age: 40
End: 2025-05-19
Payer: COMMERCIAL

## 2025-05-19 DIAGNOSIS — M25.852 OTHER SPECIFIED JOINT DISORDERS, LEFT HIP: ICD-10-CM

## 2025-05-19 DIAGNOSIS — S73.192D OTHER SPRAIN OF LEFT HIP, SUBSEQUENT ENCOUNTER: ICD-10-CM

## 2025-05-19 PROCEDURE — 99024 POSTOP FOLLOW-UP VISIT: CPT

## 2025-07-14 ENCOUNTER — APPOINTMENT (OUTPATIENT)
Dept: ORTHOPEDIC SURGERY | Facility: CLINIC | Age: 40
End: 2025-07-14

## (undated) DEVICE — VACUUM CURETTE BUSSE HOSP CURVED 11MM

## (undated) DEVICE — TUBING GYRUS ACMI COLLECTION SET 6FT

## (undated) DEVICE — VACUUM CURETTE BUSSE HOSP CURVED 9MM

## (undated) DEVICE — SOL IRR POUR NS 0.9% 1000ML

## (undated) DEVICE — DRAPE LIGHT HANDLE COVER (GREEN)

## (undated) DEVICE — WARMING BLANKET UPPER ADULT

## (undated) DEVICE — PLV-SCD MACHINE: Type: DURABLE MEDICAL EQUIPMENT

## (undated) DEVICE — VACUUM CURETTE BUSSE HOSP CURVED 12MM

## (undated) DEVICE — VENODYNE/SCD SLEEVE CALF MEDIUM

## (undated) DEVICE — VACUUM CURETTE BERKLEY OLYMPUS F TIP 6MM

## (undated) DEVICE — GOWN SMARTGOWN RAGLAN XLG

## (undated) DEVICE — VENODYNE/SCD SLEEVE CALF LARGE

## (undated) DEVICE — PREP TRAY DRY SKIN PREP SCRUB

## (undated) DEVICE — PACK LITHOTOMY

## (undated) DEVICE — GLV 7 PROTEXIS (WHITE)

## (undated) DEVICE — DRAPE TOWEL BLUE 17" X 24"

## (undated) DEVICE — VACUUM CURETTE BUSSE HOSP STRAIGHT 7MM